# Patient Record
Sex: MALE | Race: WHITE | NOT HISPANIC OR LATINO | Employment: FULL TIME | URBAN - METROPOLITAN AREA
[De-identification: names, ages, dates, MRNs, and addresses within clinical notes are randomized per-mention and may not be internally consistent; named-entity substitution may affect disease eponyms.]

---

## 2017-05-16 ENCOUNTER — APPOINTMENT (EMERGENCY)
Dept: CT IMAGING | Facility: HOSPITAL | Age: 37
End: 2017-05-16
Payer: MEDICARE

## 2017-05-16 ENCOUNTER — HOSPITAL ENCOUNTER (EMERGENCY)
Facility: HOSPITAL | Age: 37
Discharge: HOME | End: 2017-05-16
Attending: EMERGENCY MEDICINE | Admitting: EMERGENCY MEDICINE
Payer: MEDICARE

## 2017-05-16 VITALS
WEIGHT: 160 LBS | OXYGEN SATURATION: 98 % | SYSTOLIC BLOOD PRESSURE: 133 MMHG | DIASTOLIC BLOOD PRESSURE: 67 MMHG | BODY MASS INDEX: 23.7 KG/M2 | RESPIRATION RATE: 16 BRPM | TEMPERATURE: 99 F | HEART RATE: 93 BPM | HEIGHT: 69 IN

## 2017-05-16 DIAGNOSIS — S46.202A UNSPECIFIED INJURY OF MUSCLE, FASCIA AND TENDON OF OTHER PARTS OF BICEPS, LEFT ARM, INITIAL ENCOUNTER: Primary | ICD-10-CM

## 2017-05-16 DIAGNOSIS — R53.83 FATIGUE: ICD-10-CM

## 2017-05-16 LAB
ALBUMIN SERPL BCP-MCNC: 3.7 G/DL (ref 3.5–5)
ALP SERPL-CCNC: 89 U/L (ref 46–116)
ALT SERPL W P-5'-P-CCNC: 28 U/L (ref 12–78)
ANION GAP SERPL CALCULATED.3IONS-SCNC: 5 MMOL/L (ref 4–13)
AST SERPL W P-5'-P-CCNC: 48 U/L (ref 5–45)
BASOPHILS # BLD AUTO: 0.03 THOUSANDS/ΜL (ref 0–0.1)
BASOPHILS NFR BLD AUTO: 0 % (ref 0–1)
BILIRUB SERPL-MCNC: 0.7 MG/DL (ref 0.2–1)
BUN SERPL-MCNC: 16 MG/DL (ref 5–25)
CALCIUM SERPL-MCNC: 9.4 MG/DL (ref 8.3–10.1)
CHLORIDE SERPL-SCNC: 100 MMOL/L (ref 100–108)
CO2 SERPL-SCNC: 31 MMOL/L (ref 21–32)
CREAT SERPL-MCNC: 0.86 MG/DL (ref 0.6–1.3)
EOSINOPHIL # BLD AUTO: 0.25 THOUSAND/ΜL (ref 0–0.61)
EOSINOPHIL NFR BLD AUTO: 4 % (ref 0–6)
ERYTHROCYTE [DISTWIDTH] IN BLOOD BY AUTOMATED COUNT: 11.7 % (ref 11.6–15.1)
GFR SERPL CREATININE-BSD FRML MDRD: >60 ML/MIN/1.73SQ M
GLUCOSE SERPL-MCNC: 129 MG/DL (ref 65–140)
HCT VFR BLD AUTO: 47.3 % (ref 36.5–49.3)
HGB BLD-MCNC: 17 G/DL (ref 12–17)
LACTATE SERPL-SCNC: 1.4 MMOL/L (ref 0.5–2)
LYMPHOCYTES # BLD AUTO: 2.06 THOUSANDS/ΜL (ref 0.6–4.47)
LYMPHOCYTES NFR BLD AUTO: 30 % (ref 14–44)
MCH RBC QN AUTO: 31.1 PG (ref 26.8–34.3)
MCHC RBC AUTO-ENTMCNC: 35.9 G/DL (ref 31.4–37.4)
MCV RBC AUTO: 87 FL (ref 82–98)
MONOCYTES # BLD AUTO: 0.7 THOUSAND/ΜL (ref 0.17–1.22)
MONOCYTES NFR BLD AUTO: 10 % (ref 4–12)
NEUTROPHILS # BLD AUTO: 3.73 THOUSANDS/ΜL (ref 1.85–7.62)
NEUTS SEG NFR BLD AUTO: 55 % (ref 43–75)
NRBC BLD AUTO-RTO: 0 /100 WBCS
PLATELET # BLD AUTO: 265 THOUSANDS/UL (ref 149–390)
PMV BLD AUTO: 9 FL (ref 8.9–12.7)
POTASSIUM SERPL-SCNC: 4.9 MMOL/L (ref 3.5–5.3)
PROT SERPL-MCNC: 7.5 G/DL (ref 6.4–8.2)
RBC # BLD AUTO: 5.46 MILLION/UL (ref 3.88–5.62)
SODIUM SERPL-SCNC: 136 MMOL/L (ref 136–145)
WBC # BLD AUTO: 6.78 THOUSAND/UL (ref 4.31–10.16)

## 2017-05-16 PROCEDURE — 96360 HYDRATION IV INFUSION INIT: CPT

## 2017-05-16 PROCEDURE — 73201 CT UPPER EXTREMITY W/DYE: CPT

## 2017-05-16 PROCEDURE — 83605 ASSAY OF LACTIC ACID: CPT | Performed by: NURSE PRACTITIONER

## 2017-05-16 PROCEDURE — 80053 COMPREHEN METABOLIC PANEL: CPT | Performed by: NURSE PRACTITIONER

## 2017-05-16 PROCEDURE — 36415 COLL VENOUS BLD VENIPUNCTURE: CPT | Performed by: NURSE PRACTITIONER

## 2017-05-16 PROCEDURE — 85025 COMPLETE CBC W/AUTO DIFF WBC: CPT | Performed by: NURSE PRACTITIONER

## 2017-05-16 PROCEDURE — 99285 EMERGENCY DEPT VISIT HI MDM: CPT

## 2017-05-16 RX ORDER — NAPROXEN 500 MG/1
500 TABLET ORAL 2 TIMES DAILY WITH MEALS
Qty: 10 TABLET | Refills: 0 | Status: SHIPPED | OUTPATIENT
Start: 2017-05-16 | End: 2017-05-21

## 2017-05-16 RX ORDER — HYDROCODONE BITARTRATE AND ACETAMINOPHEN 5; 325 MG/1; MG/1
1 TABLET ORAL EVERY 6 HOURS PRN
Qty: 12 TABLET | Refills: 0 | Status: SHIPPED | OUTPATIENT
Start: 2017-05-16

## 2017-05-16 RX ADMIN — SODIUM CHLORIDE 1000 ML: 0.9 INJECTION, SOLUTION INTRAVENOUS at 19:41

## 2017-05-16 RX ADMIN — IOHEXOL 85 ML: 350 INJECTION, SOLUTION INTRAVENOUS at 20:33

## 2022-03-03 ENCOUNTER — OFFICE VISIT (OUTPATIENT)
Dept: FAMILY MEDICINE CLINIC | Facility: CLINIC | Age: 42
End: 2022-03-03
Payer: COMMERCIAL

## 2022-03-03 VITALS
OXYGEN SATURATION: 96 % | WEIGHT: 178 LBS | DIASTOLIC BLOOD PRESSURE: 80 MMHG | RESPIRATION RATE: 16 BRPM | SYSTOLIC BLOOD PRESSURE: 108 MMHG | BODY MASS INDEX: 26.36 KG/M2 | HEIGHT: 69 IN | TEMPERATURE: 97.7 F | HEART RATE: 93 BPM

## 2022-03-03 DIAGNOSIS — G56.02 CARPAL TUNNEL SYNDROME OF LEFT WRIST: ICD-10-CM

## 2022-03-03 DIAGNOSIS — R53.83 FATIGUE, UNSPECIFIED TYPE: ICD-10-CM

## 2022-03-03 DIAGNOSIS — M25.50 ARTHRALGIA, UNSPECIFIED JOINT: Primary | ICD-10-CM

## 2022-03-03 DIAGNOSIS — M79.10 MYALGIA: ICD-10-CM

## 2022-03-03 DIAGNOSIS — Z13.1 SCREENING FOR DIABETES MELLITUS: ICD-10-CM

## 2022-03-03 DIAGNOSIS — Z13.6 SCREENING FOR CARDIOVASCULAR CONDITION: ICD-10-CM

## 2022-03-03 DIAGNOSIS — S23.41XA RIB SPRAIN, INITIAL ENCOUNTER: ICD-10-CM

## 2022-03-03 PROCEDURE — 99204 OFFICE O/P NEW MOD 45 MIN: CPT | Performed by: FAMILY MEDICINE

## 2022-03-03 PROCEDURE — 4004F PT TOBACCO SCREEN RCVD TLK: CPT | Performed by: FAMILY MEDICINE

## 2022-03-03 PROCEDURE — 3008F BODY MASS INDEX DOCD: CPT | Performed by: FAMILY MEDICINE

## 2022-03-03 RX ORDER — CYCLOBENZAPRINE HCL 5 MG
5 TABLET ORAL 3 TIMES DAILY PRN
Qty: 30 TABLET | Refills: 1 | Status: SHIPPED | OUTPATIENT
Start: 2022-03-03

## 2022-03-03 NOTE — PROGRESS NOTES
FAMILY PRACTICE OFFICE VISIT       NAME: [employer] Yonatan  AGE: 39 y o  SEX: male       : 1980        MRN: [id number]    DATE: 3/3/2022  TIME: 10:05 PM    Assessment and Plan   1  Arthralgia, unspecified joint  Comments:  Pt stable on exam - unclear etiology for symptoms at this time  Check full battery of autoimmune arthritis labs (DRE, CRP, Lyme, etc) and CK level  Orders:  -     CBC and differential; Future  -     Sedimentation rate, automated; Future  -     C-reactive protein; Future  -     Lyme Antibody Profile with reflex to WB; Future  -     Sjogren's Antibodies; Future  -     DRE Screen w/ Reflex to Titer/Pattern; Future  -     RF Screen w/ Reflex to Titer; Future  -     CBC and differential  -     Sedimentation rate, automated  -     C-reactive protein  -     Lyme Antibody Profile with reflex to WB  -     Sjogren's Antibodies    2  Myalgia  Comments:  As above  Orders:  -     cyclobenzaprine (FLEXERIL) 5 mg tablet; Take 1 tablet (5 mg total) by mouth 3 (three) times a day as needed for muscle spasms  -     CBC and differential; Future  -     CK (with reflex to MB); Future  -     CBC and differential  -     CK (with reflex to MB)    3  Rib sprain, initial encounter  Comments:  Suspect more soft tissue injury with fall  Check xrays as precaution  Heat to region, OTC NSAIDs with food PRN, Cyclobenzoprine PRN muscle spasm  Orders:  -     XR ribs left w pa chest min 3 views; Future; Expected date: 2022  -     cyclobenzaprine (FLEXERIL) 5 mg tablet; Take 1 tablet (5 mg total) by mouth 3 (three) times a day as needed for muscle spasms    4  Carpal tunnel syndrome of left wrist  Comments:  Light at this time - pt to take frequent, short breaks to shake out hands, OTC wrist splints at night, etc     5  Screening for cardiovascular condition  Comments:  FBW incl with other labs as well  Pt also urged to stop smoking today  Orders:  -     Lipid Panel with Direct LDL reflex;  Future  -     Lipid Panel with Direct LDL reflex    6  Screening for diabetes mellitus  -     Comprehensive metabolic panel; Future  -     Comprehensive metabolic panel    7  Fatigue, unspecified type  -     TSH, 3rd generation with Free T4 reflex; Future  -     TSH, 3rd generation with Free T4 reflex         There are no Patient Instructions on file for this visit  Chief Complaint     Chief Complaint   Patient presents with    New Patient Visit     spansms       History of Present Illness   Caden Juárez is a 39y o -year-old male who presents as a new patient today  Presents with his fiance  Has had issues with muscle locking - legs, feet, ankles, hips, etc   When incarcerated, the physcians there thought it was more of a "nervous condition "   Symptoms relavent now x approx 4 years  Pt has spent a lot of time in the woods, outdoors  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  Mother with hx of MS; no arthritic conditions  Did fall off a smaller ladder at work earlier this week - fell back, striking the left lower thoracic back region  Also noting some light numbness in the first 3 digits of the left hand - pt does work with his hands, using wrenches, etc   [place] PDMP was checked, and clear  Of Note:  Pt was 10 - 15 min late for his New Patient appt today, but was still seen and examined  Review of Systems   Review of Systems   Constitutional: Negative for activity change and fever  Respiratory: Negative for shortness of breath  Cardiovascular: Negative for chest pain  Gastrointestinal: Negative for abdominal pain  Musculoskeletal: Positive for arthralgias and myalgias  Negative for joint swelling  Mild arthralgias - muscles are worse  Neurological: Positive for numbness  Negative for headaches  Numbness in the left hand  Active Problem List   There is no problem list on file for this patient  Past Medical History:  History reviewed   No pertinent past medical history  Past Surgical History:  History reviewed  No pertinent surgical history  Family History:  History reviewed  No pertinent family history  Social History:  Social History     Socioeconomic History    Marital status: Single     Spouse name: Not on file    Number of children: Not on file    Years of education: Not on file    Highest education level: Not on file   Occupational History    Not on file   Tobacco Use    Smoking status: Current Every Day Smoker     Packs/day: 0 75     Start date: 18    Smokeless tobacco: Current User     Types: Chew   Vaping Use    Vaping Use: Never used   Substance and Sexual Activity    Alcohol use: No    Drug use: No    Sexual activity: Yes     Partners: Female   Other Topics Concern    Not on file   Social History Narrative    Not on file     Social Determinants of Health     Financial Resource Strain: Not on file   Food Insecurity: Not on file   Transportation Needs: Not on file   Physical Activity: Not on file   Stress: Not on file   Social Connections: Not on file   Intimate Partner Violence: Not on file   Housing Stability: Not on file       Objective     Vitals:    03/03/22 1447   BP: 108/80   Pulse: 93   Resp: 16   Temp: 97 7 °F (36 5 °C)   SpO2: 96%     Wt Readings from Last 3 Encounters:   03/03/22 80 7 kg (178 lb)   05/16/17 72 6 kg (160 lb)       Physical Exam  Vitals and nursing note reviewed  Constitutional:       General: He is not in acute distress  Appearance: Normal appearance  He is not ill-appearing, toxic-appearing or diaphoretic  HENT:      Head: Normocephalic and atraumatic  Right Ear: Tympanic membrane, ear canal and external ear normal  There is no impacted cerumen  Left Ear: Tympanic membrane, ear canal and external ear normal  There is no impacted cerumen  Eyes:      General: No scleral icterus  Extraocular Movements: Extraocular movements intact        Conjunctiva/sclera: Conjunctivae normal       Pupils: Pupils are equal, round, and reactive to light  Cardiovascular:      Rate and Rhythm: Normal rate and regular rhythm  Heart sounds: Normal heart sounds  No murmur heard  No friction rub  No gallop  Pulmonary:      Effort: Pulmonary effort is normal  No respiratory distress  Breath sounds: Normal breath sounds  No stridor  No wheezing, rhonchi or rales  Abdominal:      General: Abdomen is flat  Bowel sounds are normal  There is no distension  Palpations: Abdomen is soft  There is no mass  Tenderness: There is no abdominal tenderness  There is no guarding or rebound  Musculoskeletal:      Right hand: Normal  No swelling  Left hand: Normal  No swelling  Cervical back: Normal range of motion and neck supple  No rigidity or tenderness  Back:       Comments: Pt pointed to the first 3 digits of the left hand, where he is having his numbness  Lymphadenopathy:      Cervical: No cervical adenopathy  Neurological:      Mental Status: He is alert and oriented to person, place, and time  Sensory: Sensation is intact  No sensory deficit  Motor: Motor function is intact  No weakness  Gait: Gait normal       Deep Tendon Reflexes:      Reflex Scores:       Patellar reflexes are 2+ on the right side and 2+ on the left side  Achilles reflexes are 2+ on the right side and 2+ on the left side  Comments: Normal sensation in both feet  MS +5/5 bilateral lower extremities  Psychiatric:         Mood and Affect: Mood is anxious  Affect is not inappropriate  Speech: Speech normal          Behavior: Behavior normal          Thought Content:  Thought content normal          Judgment: Judgment normal          Pertinent Laboratory/Diagnostic Studies:  Lab Results   Component Value Date    BUN 16 05/16/2017    CREATININE 0 86 05/16/2017    CALCIUM 9 4 05/16/2017    K 4 9 05/16/2017    CO2 31 05/16/2017     05/16/2017     Lab Results   Component Value Date ALT 28 05/16/2017    AST 48 (H) 05/16/2017    ALKPHOS 89 05/16/2017       Lab Results   Component Value Date    WBC 6 78 05/16/2017    HGB 17 0 05/16/2017    HCT 47 3 05/16/2017    MCV 87 05/16/2017     05/16/2017       No results found for: TSH    No results found for: CHOL  No results found for: TRIG  No results found for: HDL  No results found for: LDLCALC  No results found for: HGBA1C    Results for orders placed or performed during the hospital encounter of 05/16/17   CBC and differential   Result Value Ref Range    WBC 6 78 4 31 - 10 16 Thousand/uL    RBC 5 46 3 88 - 5 62 Million/uL    Hemoglobin 17 0 12 0 - 17 0 g/dL    Hematocrit 47 3 36 5 - 49 3 %    MCV 87 82 - 98 fL    MCH 31 1 26 8 - 34 3 pg    MCHC 35 9 31 4 - 37 4 g/dL    RDW 11 7 11 6 - 15 1 %    MPV 9 0 8 9 - 12 7 fL    Platelets 105 021 - 171 Thousands/uL    nRBC 0 /100 WBCs    Neutrophils Relative 55 43 - 75 %    Lymphocytes Relative 30 14 - 44 %    Monocytes Relative 10 4 - 12 %    Eosinophils Relative 4 0 - 6 %    Basophils Relative 0 0 - 1 %    Neutrophils Absolute 3 73 1 85 - 7 62 Thousands/µL    Lymphocytes Absolute 2 06 0 60 - 4 47 Thousands/µL    Monocytes Absolute 0 70 0 17 - 1 22 Thousand/µL    Eosinophils Absolute 0 25 0 00 - 0 61 Thousand/µL    Basophils Absolute 0 03 0 00 - 0 10 Thousands/µL   Comprehensive metabolic panel   Result Value Ref Range    Sodium 136 136 - 145 mmol/L    Potassium 4 9 3 5 - 5 3 mmol/L    Chloride 100 100 - 108 mmol/L    CO2 31 21 - 32 mmol/L    ANION GAP 5 4 - 13 mmol/L    BUN 16 5 - 25 mg/dL    Creatinine 0 86 0 60 - 1 30 mg/dL    Glucose 129 65 - 140 mg/dL    Calcium 9 4 8 3 - 10 1 mg/dL    AST 48 (H) 5 - 45 U/L    ALT 28 12 - 78 U/L    Alkaline Phosphatase 89 46 - 116 U/L    Total Protein 7 5 6 4 - 8 2 g/dL    Albumin 3 7 3 5 - 5 0 g/dL    Total Bilirubin 0 70 0 20 - 1 00 mg/dL    eGFR >60 0 ml/min/1 73sq m   Lactic acid, plasma   Result Value Ref Range    LACTIC ACID 1 4 0 5 - 2 0 mmol/L Orders Placed This Encounter   Procedures    XR ribs left w pa chest min 3 views    CBC and differential    Comprehensive metabolic panel    Lipid Panel with Direct LDL reflex    Sedimentation rate, automated    C-reactive protein    Lyme Antibody Profile with reflex to WB    Sjogren's Antibodies    DRE Screen w/ Reflex to Titer/Pattern    RF Screen w/ Reflex to Titer    TSH, 3rd generation with Free T4 reflex    CK (with reflex to MB)       ALLERGIES:  No Known Allergies    Current Medications     Current Outpatient Medications   Medication Sig Dispense Refill    cyclobenzaprine (FLEXERIL) 5 mg tablet Take 1 tablet (5 mg total) by mouth 3 (three) times a day as needed for muscle spasms 30 tablet 1    HYDROcodone-acetaminophen (NORCO) 5-325 mg per tablet Take 1 tablet by mouth every 6 (six) hours as needed (Not relieved by Anti-inflammatory) for up to 12 doses Max Daily Amount: 4 tablets (Patient not taking: Reported on 3/3/2022 ) 12 tablet 0    naproxen (NAPROSYN) 500 mg tablet Take 1 tablet by mouth 2 (two) times a day with meals for 5 days (Patient not taking: Reported on 3/3/2022 ) 10 tablet 0     No current facility-administered medications for this visit           Health Maintenance     Health Maintenance   Topic Date Due    Hepatitis C Screening  Never done    Pneumococcal Vaccine: Pediatrics (0 to 5 Years) and At-Risk Patients (6 to 59 Years) (1 of 2 - PPSV23) Never done    Depression Screening  Never done    HIV Screening  Never done    BMI: Followup Plan  Never done    Annual Physical  Never done    DTaP,Tdap,and Td Vaccines (1 - Tdap) Never done    COVID-19 Vaccine (2 - Booster for Kaylynn series) 06/30/2021    Influenza Vaccine (1) Never done    BMI: Adult  03/03/2023    HIB Vaccine  Aged Out    Hepatitis B Vaccine  Aged Out    IPV Vaccine  Aged Out    Hepatitis A Vaccine  Aged Out    Meningococcal ACWY Vaccine  Aged Out    HPV Vaccine  Aged Out       There is no immunization history on file for this patient  Tobacco Cessation Counseling: Tobacco cessation counseling was provided  The patient is sincerely urged to quit consumption of tobacco  He is not ready to quit tobacco  Medication options and side effects of medication discussed  Patient refused medication         [address],

## 2022-10-21 ENCOUNTER — HOSPITAL ENCOUNTER (EMERGENCY)
Facility: HOSPITAL | Age: 42
Discharge: HOME | End: 2022-10-21
Attending: EMERGENCY MEDICINE
Payer: COMMERCIAL

## 2022-10-21 VITALS
DIASTOLIC BLOOD PRESSURE: 94 MMHG | TEMPERATURE: 97.9 F | SYSTOLIC BLOOD PRESSURE: 138 MMHG | HEART RATE: 90 BPM | OXYGEN SATURATION: 98 % | RESPIRATION RATE: 18 BRPM

## 2022-10-21 DIAGNOSIS — S05.00XA CORNEAL ABRASION: Primary | ICD-10-CM

## 2022-10-21 PROCEDURE — 99284 EMERGENCY DEPT VISIT MOD MDM: CPT

## 2022-10-21 RX ORDER — ERYTHROMYCIN 5 MG/G
0.5 OINTMENT OPHTHALMIC ONCE
Status: COMPLETED | OUTPATIENT
Start: 2022-10-21 | End: 2022-10-21

## 2022-10-21 RX ORDER — ERYTHROMYCIN 5 MG/G
OINTMENT OPHTHALMIC
Qty: 3.5 G | Refills: 0 | Status: SHIPPED | OUTPATIENT
Start: 2022-10-21

## 2022-10-21 RX ORDER — TETRACAINE HYDROCHLORIDE 5 MG/ML
2 SOLUTION OPHTHALMIC ONCE
Status: COMPLETED | OUTPATIENT
Start: 2022-10-21 | End: 2022-10-21

## 2022-10-21 RX ADMIN — TETRACAINE HYDROCHLORIDE 2 DROP: 5 SOLUTION OPHTHALMIC at 16:36

## 2022-10-21 RX ADMIN — FLUORESCEIN SODIUM 1 STRIP: 1 STRIP OPHTHALMIC at 16:36

## 2022-10-21 RX ADMIN — ERYTHROMYCIN 0.5 INCH: 5 OINTMENT OPHTHALMIC at 16:47

## 2022-10-21 NOTE — ED PROVIDER NOTES
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  Pts right eye is blurry/burning and unable to open fully  Patient reports that he was working in the garage yesterday, pulled something off of a shelf that caused test, with the shelf as well  Patient reports that it felt like "Someone through sand in my eyes "  Patient reports that he flushed his eyes at that time  Patient reports that he continues to have right eye sensation of foreign body that is irritating  Patient reports photosensitivity  Patient reports that he there is something in his eye  Patient reports that his tetanus shot was updated present 1 year ago  Patient denies any blunt trauma to eye  Patient states that it feels like the times that he has is "scratched my eye in the past "           Prior to Admission Medications   Prescriptions Last Dose Informant Patient Reported? Taking? HYDROcodone-acetaminophen (NORCO) 5-325 mg per tablet   No No   Sig: Take 1 tablet by mouth every 6 (six) hours as needed (Not relieved by Anti-inflammatory) for up to 12 doses Max Daily Amount: 4 tablets   Patient not taking: Reported on 3/3/2022    cyclobenzaprine (FLEXERIL) 5 mg tablet   No No   Sig: Take 1 tablet (5 mg total) by mouth 3 (three) times a day as needed for muscle spasms   naproxen (NAPROSYN) 500 mg tablet   No No   Sig: Take 1 tablet by mouth 2 (two) times a day with meals for 5 days   Patient not taking: Reported on 3/3/2022       Facility-Administered Medications: None       History reviewed  No pertinent past medical history  History reviewed  No pertinent surgical history  History reviewed  No pertinent family history  I have reviewed and agree with the history as documented      E-Cigarette/Vaping   • E-Cigarette Use Never User      E-Cigarette/Vaping Substances   • Nicotine No    • THC No    • CBD No    • Flavoring No    • Other No    • Unknown No      Social History     Tobacco Use   • Smoking status: Current Every Day Smoker     Packs/day: 0 75     Start date: 1995   • Smokeless tobacco: Current User     Types: Chew   Vaping Use   • Vaping Use: Never used   Substance Use Topics   • Alcohol use: No   • Drug use: No       Review of Systems   Constitutional: Negative  HENT: Negative  Eyes: Positive for photophobia, redness and itching  Negative for pain, discharge and visual disturbance  Respiratory: Negative  Cardiovascular: Negative  Gastrointestinal: Negative  Endocrine: Negative  Musculoskeletal: Negative  Neurological: Negative  Hematological: Negative  Psychiatric/Behavioral: Negative  All other systems reviewed and are negative  Physical Exam  Physical Exam  Vitals and nursing note reviewed  Constitutional:       General: He is not in acute distress  Appearance: Normal appearance  He is normal weight  He is not ill-appearing  HENT:      Head: Normocephalic  Right Ear: External ear normal       Left Ear: External ear normal       Nose: Nose normal       Mouth/Throat:      Mouth: Mucous membranes are moist    Eyes:      General: Lids are normal  Lids are everted, no foreign bodies appreciated  Vision grossly intact  Gaze aligned appropriately  Right eye: No foreign body  Left eye: No foreign body  Extraocular Movements: Extraocular movements intact  Right eye: Normal extraocular motion and no nystagmus  Left eye: Normal extraocular motion and no nystagmus  Conjunctiva/sclera:      Right eye: Right conjunctiva is injected  No chemosis, exudate or hemorrhage  Left eye: Left conjunctiva is not injected  No chemosis, exudate or hemorrhage  Pupils: Pupils are equal, round, and reactive to light  Right eye: Pupil is round, reactive and not sluggish  Corneal abrasion and fluorescein uptake present  Ben exam negative  Left eye: Pupil is round, reactive and not sluggish  No corneal abrasion or fluorescein uptake  Ben exam negative  Visual Fields: Right eye visual fields normal and left eye visual fields normal       Comments: Visual acuity completed after tetracaine applied   Right: 20/30  Left: 20/40  Both: 20/20    Cardiovascular:      Rate and Rhythm: Normal rate  Pulses: Normal pulses  Pulmonary:      Effort: Pulmonary effort is normal    Abdominal:      General: Abdomen is flat  Musculoskeletal:         General: Normal range of motion  Skin:     General: Skin is warm  Capillary Refill: Capillary refill takes less than 2 seconds  Neurological:      General: No focal deficit present  Mental Status: He is alert  Vital Signs  ED Triage Vitals [10/21/22 1533]   Temperature Pulse Respirations Blood Pressure SpO2   97 9 °F (36 6 °C) 90 18 138/94 98 %      Temp Source Heart Rate Source Patient Position - Orthostatic VS BP Location FiO2 (%)   Oral Monitor Sitting Right arm --      Pain Score       3           Vitals:    10/21/22 1533   BP: 138/94   Pulse: 90   Patient Position - Orthostatic VS: Sitting         Visual Acuity  Visual Acuity    Flowsheet Row Most Recent Value   Visual acuity R eye is 20/70   Visual acuity Left eye is 20/70   Visual acuity in both eyes is 20/50   Wearing corrective eyewear/lenses?  No          ED Medications  Medications   fluorescein sodium sterile ophthalmic strip 1 strip (1 strip Right Eye Given by Other 10/21/22 1636)   tetracaine 0 5 % ophthalmic solution 2 drop (2 drops Right Eye Given by Other 10/21/22 1636)   erythromycin (ILOTYCIN) 0 5 % ophthalmic ointment 0 5 inch (0 5 inches Right Eye Given by Other 10/21/22 1647)       Diagnostic Studies  Results Reviewed     None                 No orders to display              Procedures  Procedures         ED Course                                             MDM  Number of Diagnoses or Management Options  Corneal abrasion  Diagnosis management comments: DDx: Corneal abrasion, corneal ulceration   Patient reports that tetanus shot was updated when he was in [facility] 1 year ago  Eye lid everted and no foreign body appreciated  Pt had uptake of fluorescence consistent with a corneal abrasion, which also correlates with pt's HPI  Discussed importance of follow up with Dr Nisha Lopez of Ophthalmology  Erythromycin dose provided in department  Erythromycin prescription provided to patient  Patient is aware that the amount provided today will not be enough for entire therapy  Patient's initial visual acuity was performed patient was having her discomfort to his right eye secondary to corneal abrasion  After analgesia provided via tetracaine patient was able to provide a much improved visual acuity  Discussed strict return precautions with patient  Risk of Complications, Morbidity, and/or Mortality  General comments: Pt arrived with foreign body sensation after pulling dust/dirt off a shelf  Pt found to have corneal abrasion  Pt provided with follow up to Dr Nisha Lopez  Pt provided with abx  Reviewed reasons to return to ed  Patient verbalized understanding of diagnosis and agreement with discharge plan of care as well as understanding of reasons to return to ed  Patient Progress  Patient progress: improved      Disposition  Final diagnoses:   Corneal abrasion     Time reflects when diagnosis was documented in both MDM as applicable and the Disposition within this note     Time User Action Codes Description Comment    10/21/2022  4:31 PM Yoni Esteves Add [S05 [id number]] Corneal abrasion       ED Disposition     ED Disposition   Discharge    Condition   Stable    Date/Time   Fri Oct 21, 2022  4:40 PM    Comment   Ugo Tam discharge to home/self care                 Follow-up Information     Follow up With Specialties Details Why Contact Info Additional Information    David Rodriguez MD Ophthalmology Schedule an appointment as soon as possible for a visit in 1 day For further evaluation of symptoms Amanda Gao 66  [id number] [address] Emergency Department Emergency Medicine Go to  If symptoms worsen [address] Emergency Department, [address], [place], [place], [address]          Discharge Medication List as of 10/21/2022  4:41 PM      START taking these medications    Details   erythromycin (ILOTYCIN) ophthalmic ointment Place a 1/2 inch ribbon of ointment into the lower eyelid  , Normal         CONTINUE these medications which have NOT CHANGED    Details   cyclobenzaprine (FLEXERIL) 5 mg tablet Take 1 tablet (5 mg total) by mouth 3 (three) times a day as needed for muscle spasms, Starting Thu 3/3/2022, Normal      HYDROcodone-acetaminophen (NORCO) 5-325 mg per tablet Take 1 tablet by mouth every 6 (six) hours as needed (Not relieved by Anti-inflammatory) for up to 12 doses Max Daily Amount: 4 tablets, Starting 5/16/2017, Until Discontinued, Print      naproxen (NAPROSYN) 500 mg tablet Take 1 tablet by mouth 2 (two) times a day with meals for 5 days, Starting 5/16/2017, Until Sun 5/21/17, Print             No discharge procedures on file      PDMP Review       Value Time User    PDMP Reviewed  Yes 3/3/2022  3:05 PM Desiree Segundo DO          ED Provider  Electronically Signed by           CAROL Sharp  10/22/22 4362

## 2022-10-21 NOTE — Clinical Note
Ilene Tam was seen and treated in our emergency department on 10/21/2022  No restrictions            Diagnosis:     Ilene Arcos  may return to school on return date  He may return on this date: 10/23/2022         If you have any questions or concerns, please don't hesitate to call        CAROL Medeiros    ______________________________           _______________          _______________  Hospital Representative                              Date                                Time